# Patient Record
Sex: FEMALE | Race: BLACK OR AFRICAN AMERICAN | NOT HISPANIC OR LATINO | Employment: OTHER | URBAN - METROPOLITAN AREA
[De-identification: names, ages, dates, MRNs, and addresses within clinical notes are randomized per-mention and may not be internally consistent; named-entity substitution may affect disease eponyms.]

---

## 2019-10-24 ENCOUNTER — OFFICE VISIT (OUTPATIENT)
Dept: FAMILY MEDICINE | Facility: CLINIC | Age: 24
End: 2019-10-24
Payer: COMMERCIAL

## 2019-10-24 ENCOUNTER — LAB VISIT (OUTPATIENT)
Dept: LAB | Facility: HOSPITAL | Age: 24
End: 2019-10-24
Attending: FAMILY MEDICINE
Payer: COMMERCIAL

## 2019-10-24 VITALS
DIASTOLIC BLOOD PRESSURE: 79 MMHG | HEIGHT: 65 IN | SYSTOLIC BLOOD PRESSURE: 117 MMHG | WEIGHT: 159 LBS | OXYGEN SATURATION: 97 % | RESPIRATION RATE: 16 BRPM | BODY MASS INDEX: 26.49 KG/M2 | HEART RATE: 84 BPM

## 2019-10-24 DIAGNOSIS — M54.50 CHRONIC MIDLINE LOW BACK PAIN WITHOUT SCIATICA: ICD-10-CM

## 2019-10-24 DIAGNOSIS — Z76.89 ENCOUNTER TO ESTABLISH CARE WITH NEW DOCTOR: ICD-10-CM

## 2019-10-24 DIAGNOSIS — S41.001A OPEN WOUND OF RIGHT SHOULDER, INITIAL ENCOUNTER: ICD-10-CM

## 2019-10-24 DIAGNOSIS — G89.29 CHRONIC MIDLINE LOW BACK PAIN WITHOUT SCIATICA: ICD-10-CM

## 2019-10-24 DIAGNOSIS — G35 MULTIPLE SCLEROSIS: Primary | ICD-10-CM

## 2019-10-24 LAB
BASOPHILS # BLD AUTO: 0.09 K/UL (ref 0–0.2)
BASOPHILS NFR BLD: 1 % (ref 0–1.9)
DIFFERENTIAL METHOD: ABNORMAL
EOSINOPHIL # BLD AUTO: 0.2 K/UL (ref 0–0.5)
EOSINOPHIL NFR BLD: 2.1 % (ref 0–8)
ERYTHROCYTE [DISTWIDTH] IN BLOOD BY AUTOMATED COUNT: 15.4 % (ref 11.5–14.5)
HCT VFR BLD AUTO: 35.5 % (ref 37–48.5)
HGB BLD-MCNC: 11.2 G/DL (ref 12–16)
IMM GRANULOCYTES # BLD AUTO: 0.03 K/UL (ref 0–0.04)
IMM GRANULOCYTES NFR BLD AUTO: 0.3 % (ref 0–0.5)
LYMPHOCYTES # BLD AUTO: 1.6 K/UL (ref 1–4.8)
LYMPHOCYTES NFR BLD: 18.3 % (ref 18–48)
MCH RBC QN AUTO: 26.7 PG (ref 27–31)
MCHC RBC AUTO-ENTMCNC: 31.5 G/DL (ref 32–36)
MCV RBC AUTO: 85 FL (ref 82–98)
MONOCYTES # BLD AUTO: 0.9 K/UL (ref 0.3–1)
MONOCYTES NFR BLD: 10 % (ref 4–15)
NEUTROPHILS # BLD AUTO: 6.1 K/UL (ref 1.8–7.7)
NEUTROPHILS NFR BLD: 68.3 % (ref 38–73)
NRBC BLD-RTO: 0 /100 WBC
PLATELET # BLD AUTO: 398 K/UL (ref 150–350)
PMV BLD AUTO: 9.1 FL (ref 9.2–12.9)
RBC # BLD AUTO: 4.2 M/UL (ref 4–5.4)
WBC # BLD AUTO: 8.93 K/UL (ref 3.9–12.7)

## 2019-10-24 PROCEDURE — 99203 PR OFFICE/OUTPT VISIT, NEW, LEVL III, 30-44 MIN: ICD-10-PCS | Mod: S$GLB,,, | Performed by: FAMILY MEDICINE

## 2019-10-24 PROCEDURE — 99203 OFFICE O/P NEW LOW 30 MIN: CPT | Mod: S$GLB,,, | Performed by: FAMILY MEDICINE

## 2019-10-24 PROCEDURE — 3008F BODY MASS INDEX DOCD: CPT | Mod: S$GLB,,, | Performed by: FAMILY MEDICINE

## 2019-10-24 PROCEDURE — 87070 CULTURE OTHR SPECIMN AEROBIC: CPT

## 2019-10-24 PROCEDURE — 3008F PR BODY MASS INDEX (BMI) DOCUMENTED: ICD-10-PCS | Mod: S$GLB,,, | Performed by: FAMILY MEDICINE

## 2019-10-24 PROCEDURE — 87077 CULTURE AEROBIC IDENTIFY: CPT

## 2019-10-24 PROCEDURE — 85025 COMPLETE CBC W/AUTO DIFF WBC: CPT

## 2019-10-24 PROCEDURE — 87186 SC STD MICRODIL/AGAR DIL: CPT

## 2019-10-24 PROCEDURE — 36415 COLL VENOUS BLD VENIPUNCTURE: CPT

## 2019-10-24 RX ORDER — OXYCODONE AND ACETAMINOPHEN 10; 325 MG/1; MG/1
1 TABLET ORAL EVERY 4 HOURS PRN
COMMUNITY
End: 2019-10-24 | Stop reason: SDUPTHER

## 2019-10-24 RX ORDER — OXYCODONE AND ACETAMINOPHEN 10; 325 MG/1; MG/1
1 TABLET ORAL EVERY 12 HOURS PRN
Qty: 60 TABLET | Refills: 0 | Status: SHIPPED | OUTPATIENT
Start: 2019-10-24 | End: 2019-11-18 | Stop reason: SDUPTHER

## 2019-10-24 RX ORDER — AMITRIPTYLINE HYDROCHLORIDE 25 MG/1
25 TABLET, FILM COATED ORAL NIGHTLY PRN
COMMUNITY
End: 2019-11-15 | Stop reason: ALTCHOICE
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
## 2019-10-28 LAB — BACTERIA SPEC AEROBE CULT: ABNORMAL

## 2019-10-30 RX ORDER — CIPROFLOXACIN 750 MG/1
750 TABLET, FILM COATED ORAL EVERY 12 HOURS
Qty: 28 TABLET | Refills: 0 | Status: SHIPPED | OUTPATIENT
Start: 2019-10-30 | End: 2019-11-13

## 2019-10-31 ENCOUNTER — TELEPHONE (OUTPATIENT)
Dept: FAMILY MEDICINE | Facility: CLINIC | Age: 24
End: 2019-10-31

## 2019-10-31 NOTE — TELEPHONE ENCOUNTER
----- Message from Mireya Vieira sent at 10/31/2019 11:59 AM CDT -----  Contact: Celia at [facility]  Type: Needs Medical Advice    Who Called:  Celia  Best Call Back Number: [phone]  Additional Information: Celia adv'd they received orders/referral for pt but have been unsuccessful in making any contact w/ pt. They have even gone to the pt's house. Pls call regarding.

## 2019-10-31 NOTE — TELEPHONE ENCOUNTER
Spoke with patient, currently admitted to [facility] at this time for her MS.     + Spoke with April at [facility] and advised she is currently inpatient at [facility] and that once DC'd from [facility] to contact patient. April added her to her list to call and check on daily, as well as will be notified of DC and will continue care at home. All parties verbalized understanding with no further questions at this time.

## 2019-11-04 ENCOUNTER — PATIENT OUTREACH (OUTPATIENT)
Dept: ADMINISTRATIVE | Facility: HOSPITAL | Age: 24
End: 2019-11-04

## 2019-11-04 NOTE — LETTER
November 4, 2019    Jonel Del Angel  [address] [place] [address]             [facility]  [address]  [place] [address]  Phone: [phone] Dear Kevenjosé    [facility] to your overall health and would like to ensure that you are up to date on your recommended test and/or procedures.   Caridad Elena MD  has found that your chart shows you may be due for the following:    Health Maintenance Due   Topic Date Due    Lipid Panel  1994    HPV Vaccines (1 - Female 3-dose series) 04/08/2009    TETANUS VACCINE  04/08/2012    Influenza Vaccine (1) 09/01/2019     If you have had any of the above done at another facility, please let us know so that we may obtain copies from that facility.  If you have a copy of these records, please provide a copy for us to scan into your chart.  You are welcome to request that the report be faxed to us at  ([phone]).     Otherwise, please schedule these appointments at your earliest convenience by calling [phone] or going to [email or web address].    If you have an upcoming scheduled appointment for the item above, please disregard this letter.    Sincerely,  Your [facility]  MD Kamilah Simon L.P.N. Clinical Care Coordinator  [address] [place] [address], [place] [address] [phone]

## 2019-11-07 ENCOUNTER — PATIENT OUTREACH (OUTPATIENT)
Dept: ADMINISTRATIVE | Facility: HOSPITAL | Age: 24
End: 2019-11-07

## 2019-11-07 NOTE — LETTER
November 7, 2019    Jonel Del Angel  [address] [place] [address]             [facility]  [address]  [place] [address]  Phone: [phone] Dear Kevenjosé    [facility] to your overall health and would like to ensure that you are up to date on your recommended test and/or procedures.   Caridad Elena MD  has found that your chart shows you may be due for the following:    Health Maintenance Due   Topic Date Due    Lipid Panel  1994    HPV Vaccines (1 - Female 3-dose series) 04/08/2009    TETANUS VACCINE  04/08/2012    Influenza Vaccine (1) 09/01/2019     If you have had any of the above done at another facility, please let us know so that we may obtain copies from that facility.  If you have a copy of these records, please provide a copy for us to scan into your chart.  You are welcome to request that the report be faxed to us at  ([phone]).     Otherwise, please schedule these appointments at your earliest convenience by calling [phone] or going to [email or web address].    If you have an upcoming scheduled appointment for the item above, please disregard this letter.    Sincerely,  [facility]  MD Kamilah Simon L.P.N. Clinical Care Coordinator  [address] [place] [address], [place] [address] [phone]

## 2019-11-08 ENCOUNTER — TELEPHONE (OUTPATIENT)
Dept: FAMILY MEDICINE | Facility: CLINIC | Age: 24
End: 2019-11-08

## 2019-11-08 DIAGNOSIS — R33.9 URINARY RETENTION: Primary | ICD-10-CM

## 2019-11-08 NOTE — TELEPHONE ENCOUNTER
Contacted Yina with [facility] and she states that pt discharged from hospital with indwelling foster catheter due to urinary retention after voiding trial prior to discharge.  Asking for orders to change foster every month and to place foster PRN for dislodgement. Current foster placed 11/4/19.  Please advise.  States she can take a verbal over the phone.  Also requesting a urology consult.

## 2019-11-08 NOTE — TELEPHONE ENCOUNTER
----- Message from Vinita Weaver sent at 11/8/2019  2:13 PM CST -----  Contact: Yina russo/[facility] [phone]  She is requesting catheter orders for this patient. Patient also needs a urology referral. Please call her. Thank you!

## 2019-11-15 ENCOUNTER — TELEPHONE (OUTPATIENT)
Dept: UROLOGY | Facility: CLINIC | Age: 24
End: 2019-11-15

## 2019-11-15 ENCOUNTER — OFFICE VISIT (OUTPATIENT)
Dept: UROLOGY | Facility: CLINIC | Age: 24
End: 2019-11-15
Payer: COMMERCIAL

## 2019-11-15 VITALS
BODY MASS INDEX: 26.66 KG/M2 | DIASTOLIC BLOOD PRESSURE: 82 MMHG | RESPIRATION RATE: 16 BRPM | WEIGHT: 160 LBS | TEMPERATURE: 98 F | HEART RATE: 117 BPM | HEIGHT: 65 IN | SYSTOLIC BLOOD PRESSURE: 125 MMHG

## 2019-11-15 DIAGNOSIS — N31.9 NEUROGENIC BLADDER: ICD-10-CM

## 2019-11-15 PROCEDURE — 3008F BODY MASS INDEX DOCD: CPT | Mod: S$GLB,,, | Performed by: UROLOGY

## 2019-11-15 PROCEDURE — 99999 PR PBB SHADOW E&M-EST. PATIENT-LVL III: ICD-10-PCS | Mod: PBBFAC,,, | Performed by: UROLOGY

## 2019-11-15 PROCEDURE — 99203 PR OFFICE/OUTPT VISIT, NEW, LEVL III, 30-44 MIN: ICD-10-PCS | Mod: S$GLB,,, | Performed by: UROLOGY

## 2019-11-15 PROCEDURE — 99999 PR PBB SHADOW E&M-EST. PATIENT-LVL III: CPT | Mod: PBBFAC,,, | Performed by: UROLOGY

## 2019-11-15 PROCEDURE — 99203 OFFICE O/P NEW LOW 30 MIN: CPT | Mod: S$GLB,,, | Performed by: UROLOGY

## 2019-11-15 PROCEDURE — 3008F PR BODY MASS INDEX (BMI) DOCUMENTED: ICD-10-PCS | Mod: S$GLB,,, | Performed by: UROLOGY

## 2019-11-15 RX ORDER — CLONAZEPAM 0.5 MG/1
TABLET ORAL
Refills: 2 | COMMUNITY
Start: 2019-11-08

## 2019-11-15 RX ORDER — CARBAMAZEPINE 100 MG/1
CAPSULE, EXTENDED RELEASE ORAL
Refills: 2 | COMMUNITY
Start: 2019-11-02

## 2019-11-15 NOTE — PROGRESS NOTES
Mrs. Del Angel is an unfortunate 24 year old female who has developed AMS.  On 10/24/2019 the patient was found to be in urinary retention.  Also have a urinary tract infection she was treated for the infection and the catheter has been placed for temporary drainage.    The patient is here for management of her neurogenic bladder dysfunction secondary to MS.  She was present with her mother and I had a lengthy discussion with both of them regarding her condition.  The patient at the present time is wheelchair-bound under further have lost their sensation in the lower part of the abdomen and lower extremities.  She was concerned if we can remove the Serrato catheter.  I explained to her that this same problem that is happening to her lower part of the body and lower extremities is happening to her bladder and will not be in the best interest of her to have the catheter removed at this time.  She is undergoing a physical rehabilitation and I explained to her that good sign that we can probably remove the catheter for a voiding trial will be when she is able to walk a again.  In the meantime I suggested we keep the Serrato catheter unchanged every 4 weeks by home health.  We are not going to treat any infections unless the patient becomes febrile with gross hematuria and pain.  I suggested she should drink a large amount of fluids to have a good urine output.  I also suggest for her to take vitamin-C 500 mg twice a day to acidify the urine on avoid a significant UTIs.    We are contacting home health with instructions on a.m. she is going to return to the clinic in as-needed basis.  When she has started walking again we may consider a short course of antibiotics p.o. on a voiding trial.  All the questions were answered to her satisfaction.  She left the office in satisfactory condition.  I spent approximately 25 min with Ms. Del Angel of the time was spent in counseling    Adriano Santos

## 2019-11-15 NOTE — TELEPHONE ENCOUNTER
Gave verbal orders to Yina for cath change every 4 weeks and Vit C 500U twice daily. Also provided her with my contact info for any future problems with pt and if pt would become walking again. She verbalized understanding.

## 2019-11-15 NOTE — LETTER
November 15, 2019      Max Bermudez MD  [address] [place] [address]           [facility]  [address] [place] [address]  Phone: [phone]  Fax: [phone]          Patient: Jonel Del Angel   MR Number: [id number]   YOB: 1995   Date of Visit: 11/15/2019       Dear Dr. Max Bermudez:    Thank you for referring Jonel Del Angel to me for evaluation. Attached you will find relevant portions of my assessment and plan of care.    If you have questions, please do not hesitate to call me. I look forward to following Jonel Del Angel along with you.    Sincerely,    Adriano Santos MD    Enclosure  CC:  No Recipients    If you would like to receive this communication electronically, please contact [email or web address] or [phone] to request more information on [facility] access.    For providers and/or their staff who would like to refer a patient to [facility], please contact us through our one-stop-shop provider referral line, [facility] [place], at [phone].    If you feel you have received this communication in error or would no longer like to receive these types of communications, please e-mail [email or web address]

## 2019-11-18 ENCOUNTER — OFFICE VISIT (OUTPATIENT)
Dept: FAMILY MEDICINE | Facility: CLINIC | Age: 24
End: 2019-11-18
Payer: COMMERCIAL

## 2019-11-18 VITALS
SYSTOLIC BLOOD PRESSURE: 112 MMHG | OXYGEN SATURATION: 97 % | HEART RATE: 129 BPM | RESPIRATION RATE: 17 BRPM | HEIGHT: 65 IN | DIASTOLIC BLOOD PRESSURE: 75 MMHG | WEIGHT: 160 LBS | BODY MASS INDEX: 26.66 KG/M2

## 2019-11-18 DIAGNOSIS — S41.001D OPEN WOUND OF RIGHT SHOULDER, SUBSEQUENT ENCOUNTER: ICD-10-CM

## 2019-11-18 DIAGNOSIS — Z02.89 PAIN MANAGEMENT CONTRACT AGREEMENT: ICD-10-CM

## 2019-11-18 DIAGNOSIS — G89.29 CHRONIC BILATERAL LOW BACK PAIN WITHOUT SCIATICA: ICD-10-CM

## 2019-11-18 DIAGNOSIS — G35 MULTIPLE SCLEROSIS: ICD-10-CM

## 2019-11-18 DIAGNOSIS — M54.50 CHRONIC BILATERAL LOW BACK PAIN WITHOUT SCIATICA: ICD-10-CM

## 2019-11-18 DIAGNOSIS — Z09 HOSPITAL DISCHARGE FOLLOW-UP: Primary | ICD-10-CM

## 2019-11-18 PROCEDURE — 99214 OFFICE O/P EST MOD 30 MIN: CPT | Mod: S$GLB,,, | Performed by: FAMILY MEDICINE

## 2019-11-18 PROCEDURE — 3008F BODY MASS INDEX DOCD: CPT | Mod: S$GLB,,, | Performed by: FAMILY MEDICINE

## 2019-11-18 PROCEDURE — 3008F PR BODY MASS INDEX (BMI) DOCUMENTED: ICD-10-PCS | Mod: S$GLB,,, | Performed by: FAMILY MEDICINE

## 2019-11-18 PROCEDURE — 99214 PR OFFICE/OUTPT VISIT, EST, LEVL IV, 30-39 MIN: ICD-10-PCS | Mod: S$GLB,,, | Performed by: FAMILY MEDICINE

## 2019-11-18 RX ORDER — OXYCODONE AND ACETAMINOPHEN 10; 325 MG/1; MG/1
1 TABLET ORAL EVERY 12 HOURS PRN
Qty: 60 TABLET | Refills: 0 | Status: SHIPPED | OUTPATIENT
Start: 2019-11-18 | End: 2019-12-09 | Stop reason: SDUPTHER

## 2019-12-05 ENCOUNTER — TELEPHONE (OUTPATIENT)
Dept: UROLOGY | Facility: CLINIC | Age: 24
End: 2019-12-05

## 2019-12-05 DIAGNOSIS — N39.0 URINARY TRACT INFECTION ASSOCIATED WITH INDWELLING URETHRAL CATHETER, INITIAL ENCOUNTER: ICD-10-CM

## 2019-12-05 DIAGNOSIS — N31.9 NEUROGENIC BLADDER: Primary | ICD-10-CM

## 2019-12-05 DIAGNOSIS — N32.89 BLADDER SPASMS: ICD-10-CM

## 2019-12-05 DIAGNOSIS — T83.511A URINARY TRACT INFECTION ASSOCIATED WITH INDWELLING URETHRAL CATHETER, INITIAL ENCOUNTER: ICD-10-CM

## 2019-12-05 RX ORDER — OXYBUTYNIN CHLORIDE 10 MG/1
10 TABLET, EXTENDED RELEASE ORAL DAILY
Qty: 30 TABLET | Refills: 11 | Status: SHIPPED | OUTPATIENT
Start: 2019-12-05 | End: 2020-09-01

## 2019-12-05 RX ORDER — CIPROFLOXACIN 500 MG/1
500 TABLET ORAL EVERY 12 HOURS
Qty: 40 TABLET | Refills: 0 | Status: SHIPPED | OUTPATIENT
Start: 2019-12-05 | End: 2019-12-25

## 2019-12-05 NOTE — TELEPHONE ENCOUNTER
----- Message from Jessica Innerkirstie sent at 12/5/2019 12:42 PM CST -----  Contact: Yina w/ [facility]  Yina changed her foster catheter last Tuesday and now she is having a lot of urine leakage around the catheter.   Patients urine is milky and has a lot debris.  Yina is going out there this afternoon to check on her. Call back to advise at [phone].   Thanks

## 2019-12-05 NOTE — TELEPHONE ENCOUNTER
Spoke with home health nurse Yina and she states there is urine leakage around the pts catheter and its milky with sediment. Gave verbal order for urinalysis and culture. Also informed her I would send message to provider for some medication for UTI and bladder spasms. Yina verbalized understanding.

## 2019-12-05 NOTE — TELEPHONE ENCOUNTER
Informed pt that antibiotic and anti bladder spasm medication has been sent to [facility]. Explained medication directions. Pt verbalized understanding.

## 2019-12-09 ENCOUNTER — TELEPHONE (OUTPATIENT)
Dept: HOME HEALTH SERVICES | Facility: HOSPITAL | Age: 24
End: 2019-12-09

## 2019-12-09 DIAGNOSIS — G89.29 CHRONIC MIDLINE LOW BACK PAIN WITHOUT SCIATICA: ICD-10-CM

## 2019-12-09 DIAGNOSIS — G35 MULTIPLE SCLEROSIS: ICD-10-CM

## 2019-12-09 DIAGNOSIS — M54.50 CHRONIC MIDLINE LOW BACK PAIN WITHOUT SCIATICA: ICD-10-CM

## 2019-12-09 NOTE — TELEPHONE ENCOUNTER
+Refill request(s). Please advise. Thank you.       ----- Message from Toya Coker sent at 12/9/2019  2:22 PM CST -----  Contact: Pt  Type:  RX Refill Request    Who Called:  PT  Refill or New Rx:  Refill  RX Name and Strength:  oxyCODONE-acetaminophen (PERCOCET)  mg per tablet  How is the patient currently taking it? (ex. 1XDay):  As directed  Is this a 30 day or 90 day RX:  30  Preferred Pharmacy with phone number:    [facility] [place] - [address]  [address]  [facility] [address]  [place] [address]  Phone: [phone] Fax: [phone]  Local or Mail Order:  local  Ordering Provider:  Ulices Maya Call Back Number:  [phone]  Additional Information:  Please Advise ---Thank you

## 2019-12-10 RX ORDER — OXYCODONE AND ACETAMINOPHEN 10; 325 MG/1; MG/1
1 TABLET ORAL EVERY 12 HOURS PRN
Qty: 60 TABLET | Refills: 0 | Status: SHIPPED | OUTPATIENT
Start: 2019-12-10 | End: 2020-01-16 | Stop reason: SDUPTHER

## 2019-12-15 PROBLEM — G35 MULTIPLE SCLEROSIS: Status: ACTIVE | Noted: 2019-12-15

## 2019-12-15 PROBLEM — Z02.89 PAIN MANAGEMENT CONTRACT AGREEMENT: Status: ACTIVE | Noted: 2019-12-15

## 2019-12-16 NOTE — PROGRESS NOTES
[facility] Note    Subjective      Ms. Del Angel is a 24 y.o. female who presents to clinic for a hospital follow up.    Patient was seen 1 month ago to establish care.   Patient has a history of MS and couple days after her initial visit she was admitted to [facility] for a MS flare.  Her hospital stay was about 3 weeks long.   She is no wheel-chair bound. Has a urinary cath in place.     At her initial visit with me, she was found to have an open wound on her right shoulder that was infected. She states that while inpatient, she was being treated for it. States that she called  when she was discharged from the hospital but no one came by. Reports that the wound has improved.     She is currently on percocet prn pain. Needs to sign pain contract.    PMH Jonel has a past medical history of MS (multiple sclerosis) (06/2018).   PSXH Jonel has a past surgical history that includes [place] tooth extraction (2015).   CECILY Remy's family history includes No Known Problems in her father and mother.   MITCH Remy reports that she has never smoked. She has never used smokeless tobacco. She reports that she drank alcohol. She reports that she has current or past drug history.   EMILIANO Remy is allergic to penicillins; trazodone; and ibuprofen.   MED Jonel has a current medication list which includes the following prescription(s): carbamazepine, clonazepam, ciprofloxacin hcl, oxybutynin, and oxycodone-acetaminophen.     Review of Systems   Constitutional: Negative for appetite change, chills and fever.   HENT: Negative for congestion.    Eyes: Negative for visual disturbance.   Respiratory: Negative for cough and shortness of breath.    Cardiovascular: Negative for chest pain.   Gastrointestinal: Negative for abdominal pain.   Musculoskeletal: Positive for back pain.   Skin: Positive for wound. Negative for color change.   Neurological: Negative for dizziness, syncope and headaches.     Objective     /75 (BP Location:  "Left arm, Patient Position: Sitting, BP Method: Medium (Automatic))   Pulse (!) 129   Resp 17   Ht 5' 5" (1.651 m)   Wt 72.6 kg (160 lb)   SpO2 97%   BMI 26.63 kg/m²     Physical Exam   Constitutional: She appears well-developed and well-nourished. No distress.   In wheelchair   HENT:   Head: Normocephalic and atraumatic.   Eyes: Conjunctivae are normal. Right eye exhibits no discharge. Left eye exhibits no discharge.   Cardiovascular: Normal rate, regular rhythm, normal heart sounds and intact distal pulses. Exam reveals no gallop and no friction rub.   No murmur heard.  No pitting edema in the lower extremities bilaterally   Pulmonary/Chest: Effort normal and breath sounds normal. No stridor. No respiratory distress. She has no wheezes. She has no rales.   Neurological: She is alert.   Skin: Skin is warm and dry. Capillary refill takes less than 2 seconds. She is not diaphoretic.   Wound on the right shoulder has much improved from previous visit. No drainage or warmth noted. +epithialization and granulation noted.    Psychiatric: She has a normal mood and affect. Her behavior is normal.   Vitals reviewed.           Assessment/Plan     Jonel was seen today for hospital discharge follow-up.    Diagnoses and all orders for this visit:    Hospital discharge follow-up       - Will obtain records from [facility] and review    Open wound of right shoulder, subsequent encounter       - Healing well. Will contact [facility] for wound care at home.     Multiple sclerosis  -   oxyCODONE-acetaminophen (PERCOCET)  mg per tablet; Take 1 tablet by mouth every 12 (twelve) hours as needed for Pain.    Chronic midline low back pain without sciatica  -    oxyCODONE-acetaminophen (PERCOCET)  mg per tablet; Take 1 tablet by mouth every 12 (twelve) hours as needed for Pain.    Pain management contract agreement         - Discussed the rules of signing pain contract with me. Patient agreed and signed.          - [facility] reviewed " by me and no suspicious activity was noted.    Follow up in about 2 months (around 1/18/2020).    Future Appointments   Date Time Provider Department Center   1/23/2020 10:40 AM Caridad Elena MD [facility]       Caridad Elena MD  Family Medicine  [facility]

## 2019-12-17 ENCOUNTER — TELEPHONE (OUTPATIENT)
Dept: UROLOGY | Facility: CLINIC | Age: 24
End: 2019-12-17

## 2019-12-17 NOTE — TELEPHONE ENCOUNTER
----- Message from Toya Coker sent at 12/17/2019  1:20 PM CST -----  Contact: [facility]   Type: Needs Medical Advice    Who Called:  Yina  Nurse  Best Call Back Number:  [phone]  Additional Information: Requesting a call back regarding pt wanting her cath completely removed  . Pt is very addiment and pt told nurse  Because its due to be changed but if the nurses changes it the pt will not let her but it back in   Please Advise ---Thank you

## 2019-12-18 ENCOUNTER — TELEPHONE (OUTPATIENT)
Dept: UROLOGY | Facility: CLINIC | Age: 24
End: 2019-12-18

## 2019-12-18 ENCOUNTER — TELEPHONE (OUTPATIENT)
Dept: FAMILY MEDICINE | Facility: CLINIC | Age: 24
End: 2019-12-18

## 2019-12-18 ENCOUNTER — TELEPHONE (OUTPATIENT)
Dept: HOME HEALTH SERVICES | Facility: HOSPITAL | Age: 24
End: 2019-12-18

## 2019-12-18 NOTE — TELEPHONE ENCOUNTER
----- Message from Vinita Weaver sent at 12/18/2019  9:22 AM CST -----  Contact: Yina russo/[facility] [phone]  The foster catheter came out, patient refused to let her put it back in.  She did inform the patient that is she does not urinate in 6-8 hours, to contact health.  Please call Yina regarding this issue.  Thank you!

## 2019-12-18 NOTE — TELEPHONE ENCOUNTER
Informed [facility] nurse Yina to leave out the pts catheter per pts request. Start I&Ox24 hrs and report to ER if she requests to resume cath.   Voiced understanding, states she will report I&O details tomorrow afternoon.           ----- Message from Austen Thornton sent at 12/18/2019 11:31 AM CST -----  Contact: Yina with [facility]   [phone]  Type: Needs Medical Advice    Who Called:  Yina with [facility]   [phone]    Additional Information:  Advised returning a call to [place] regarding the ptnt's catheter. Please call.

## 2019-12-18 NOTE — TELEPHONE ENCOUNTER
"Spoke with Yina with [facility] and she wanted to get VO's from the physician to do heel dressing changes as patient has been bed bound and chair bound only for approx 1 mo now. Yina states they have been rotating her, and transfering from bed to chair, but her heels are getting boggy, due to family not elevating with soft pillows. States no skin breakdown at this time, but the skin is "softer" and would like to do heel dressing and changes. Yina also states that she went to change patient catheter and balloon was completely deflated, and cath was out, just in between her thighs, patient did not want cath replaced. Yina states that patient did inform her that she was able to urinate today, and was advised by  that if over the weekend she is unable to contact HH and they will replace the cath. Advised Yina I would forward message to MD for OK to heel dressing change, and once received response office would contact her. Yina verbalized understanding with no further concerns or questions at this time.   "

## 2019-12-18 NOTE — TELEPHONE ENCOUNTER
[facility] Int Order # [id number] with [facility] - Dr. Adriano Santos

## 2019-12-18 NOTE — TELEPHONE ENCOUNTER
----- Message from Austen Thornton sent at 12/18/2019 11:42 AM CST -----  Contact: Yina with [facility]   [phone]  Type: Needs Medical Advice    Who Called: Yina with [facility]   [phone]    Symptoms (please be specific):  Heels are boggy will need padded dressings.    How long has patient had these symptoms:  For quite a while past two weeks.    Best Call Back Number: Yina with [facility]   [phone]    Additional Information: Advised needs an authorization to order additional padded dressings for the ptnt's heels. Please call to confirm.

## 2020-01-04 PROCEDURE — G0179 MD RECERTIFICATION HHA PT: HCPCS | Mod: ,,, | Performed by: FAMILY MEDICINE

## 2020-01-04 PROCEDURE — G0179 PR HOME HEALTH MD RECERTIFICATION: ICD-10-PCS | Mod: ,,, | Performed by: FAMILY MEDICINE

## 2020-01-08 ENCOUNTER — TELEPHONE (OUTPATIENT)
Dept: HOME HEALTH SERVICES | Facility: HOSPITAL | Age: 25
End: 2020-01-08

## 2020-01-08 NOTE — TELEPHONE ENCOUNTER
[facility] Int Order # [id number] with [facility] - Dr. Caridad Elena

## 2020-01-09 ENCOUNTER — PATIENT OUTREACH (OUTPATIENT)
Dept: ADMINISTRATIVE | Facility: HOSPITAL | Age: 25
End: 2020-01-09

## 2020-01-09 NOTE — LETTER
January 9, 2020    Jonel Bean  [address] [place] [address]             [facility]  [address]  [place] [address]  Phone: [phone] Dear Kevenjosé    [facility] is committed to your overall health and would like to ensure that you are up to date on your recommended test and/or procedures.   Caridad Elena MD  has found that your chart shows you may be due for the following:    Health Maintenance Due   Topic Date Due    Lipid Panel  1994    HPV Vaccines (1 - Female 3-dose series) 04/08/2009    TETANUS VACCINE  04/08/2012    Complete Opioid Risk Tool  04/08/2012    Urine Drug Screen  04/08/2012    Naloxone Prescription  04/08/2012    Influenza Vaccine (1) 09/01/2019     If you have had any of the above done at another facility, please let us know so that we may obtain copies from that facility.  If you have a copy of these records, please provide a copy for us to scan into your chart.  You are welcome to request that the report be faxed to us at  ([phone]).     Otherwise, please schedule these appointments at your earliest convenience by calling [phone] or going to [email or web address].    If you have an upcoming scheduled appointment for the item above, please disregard this letter.    Sincerely,  Your [facility]  MD Kamilah Simon L.P.N. Clinical Care Coordinator  [address] [place] [address], [place] [address] [phone]

## 2020-01-16 ENCOUNTER — OFFICE VISIT (OUTPATIENT)
Dept: FAMILY MEDICINE | Facility: CLINIC | Age: 25
End: 2020-01-16
Payer: COMMERCIAL

## 2020-01-16 VITALS
HEART RATE: 122 BPM | DIASTOLIC BLOOD PRESSURE: 71 MMHG | BODY MASS INDEX: 26.67 KG/M2 | WEIGHT: 160.06 LBS | RESPIRATION RATE: 17 BRPM | OXYGEN SATURATION: 97 % | SYSTOLIC BLOOD PRESSURE: 104 MMHG | HEIGHT: 65 IN

## 2020-01-16 DIAGNOSIS — S41.001D OPEN WOUND OF RIGHT SHOULDER, SUBSEQUENT ENCOUNTER: ICD-10-CM

## 2020-01-16 DIAGNOSIS — G89.29 CHRONIC MIDLINE LOW BACK PAIN WITHOUT SCIATICA: ICD-10-CM

## 2020-01-16 DIAGNOSIS — G35 MULTIPLE SCLEROSIS: Primary | ICD-10-CM

## 2020-01-16 DIAGNOSIS — M54.50 CHRONIC MIDLINE LOW BACK PAIN WITHOUT SCIATICA: ICD-10-CM

## 2020-01-16 PROCEDURE — 3008F BODY MASS INDEX DOCD: CPT | Mod: S$GLB,,, | Performed by: FAMILY MEDICINE

## 2020-01-16 PROCEDURE — 99214 OFFICE O/P EST MOD 30 MIN: CPT | Mod: S$GLB,,, | Performed by: FAMILY MEDICINE

## 2020-01-16 PROCEDURE — 3008F PR BODY MASS INDEX (BMI) DOCUMENTED: ICD-10-PCS | Mod: S$GLB,,, | Performed by: FAMILY MEDICINE

## 2020-01-16 PROCEDURE — 99214 PR OFFICE/OUTPT VISIT, EST, LEVL IV, 30-39 MIN: ICD-10-PCS | Mod: S$GLB,,, | Performed by: FAMILY MEDICINE

## 2020-01-16 RX ORDER — OXYCODONE AND ACETAMINOPHEN 10; 325 MG/1; MG/1
1 TABLET ORAL EVERY 12 HOURS PRN
Qty: 60 TABLET | Refills: 0 | Status: SHIPPED | OUTPATIENT
Start: 2020-01-16 | End: 2020-02-10 | Stop reason: SDUPTHER

## 2020-01-16 NOTE — TELEPHONE ENCOUNTER
----- Message from Cesar Camp sent at 1/16/2020  4:47 PM CST -----  Type:  Patient Returning Call    Who Called:  Kristy/[facility]    Who Left Message for Patient:  Orquidea  Does the patient know what this is regarding?:  Petroleum gauze dressing  Best Call Back Number:  [phone]  Additional Information:

## 2020-01-20 ENCOUNTER — TELEPHONE (OUTPATIENT)
Dept: FAMILY MEDICINE | Facility: CLINIC | Age: 25
End: 2020-01-20

## 2020-01-20 NOTE — TELEPHONE ENCOUNTER
Notified Kristy with [facility] about Dr Bishop's orders. Nurse voiced understanding.    LVM for Kristy with [facility] to return call for orders.X2      ----- Message from Caridad Elena MD sent at 1/19/2020 11:09 AM CST -----  Please apply petroleum guaze to right shoulder daily     ----- Message -----  From: Tabatha Freeman LPN  Sent: 1/17/2020   5:49 PM CST  To: Caridad Elena MD        ----- Message -----  From: Mira Cerda  Sent: 1/17/2020   1:33 PM CST  To: Ulices ARZATE Staff     Kristy  With  [facility]   Calling about a  Wound  Care order // please call  For details [phone]   Fax# [phone]

## 2020-01-27 NOTE — PROGRESS NOTES
"[facility] - Clinic Note    Subjective      Ms. Del Angel is a 24 y.o. female who presents to clinic for a medication refill.       Patient has a history of MS.  No recent hospitalizations.   No recent flare ups.   Doing well.   Continues to be followed by a specialist in [place]. Next infusion is in a couple of months.  Going through PT. Getting stronger.   Currently on PM contract. Receives percocet for chronic back pain secondary to MS.    Still has wound on right shoulder. Reports getting better. Nurse comes to change dressing.    PMH Jonel has a past medical history of MS (multiple sclerosis) (06/2018).   PSXH Jonel has a past surgical history that includes [place] tooth extraction (2015).    Jonel's family history includes No Known Problems in her father and mother.   MITCH Remy reports that she has never smoked. She has never used smokeless tobacco. She reports that she drank alcohol. She reports that she has current or past drug history.   ALG Jonel is allergic to penicillins; trazodone; and ibuprofen.   MED Jonel has a current medication list which includes the following prescription(s): carbamazepine, clonazepam, oxybutynin, oxycodone-acetaminophen, and bismuth tribrom-petrolatum,wh.     Review of Systems   Constitutional: Negative for chills and fever.   Eyes: Negative for visual disturbance.   Respiratory: Negative for cough and shortness of breath.    Cardiovascular: Negative for chest pain.   Gastrointestinal: Negative for abdominal pain.   Genitourinary: Negative for difficulty urinating.   Musculoskeletal: Positive for back pain and gait problem.   Neurological: Positive for weakness. Negative for dizziness and headaches.   Psychiatric/Behavioral: Negative for confusion.         Objective         /71 (BP Location: Left arm, Patient Position: Sitting, BP Method: Medium (Automatic))   Pulse (!) 122   Resp 17   Ht 5' 5" (1.651 m)   Wt 72.6 kg (160 lb 0.9 oz)   SpO2 97%   BMI 26.63 kg/m² "     Physical Exam   Constitutional: She appears well-developed and well-nourished. No distress.   AAF in wheelchair   HENT:   Head: Atraumatic.   Eyes: Right eye exhibits no discharge. Left eye exhibits no discharge.   Neck: Neck supple. No thyromegaly present.   Cardiovascular: Normal rate, regular rhythm, normal heart sounds and intact distal pulses. Exam reveals no gallop and no friction rub.   No murmur heard.  Pulmonary/Chest: Effort normal and breath sounds normal. No respiratory distress. She has no wheezes. She has no rales.   Lymphadenopathy:     She has no cervical adenopathy.   Neurological: She is alert.   Skin: Skin is warm and dry. Capillary refill takes less than 2 seconds. She is not diaphoretic.   Psychiatric: She has a normal mood and affect. Her behavior is normal.   Vitals reviewed.     Assessment/Plan     Jonel was seen today for follow-up of MS.    Diagnoses and all orders for this visit:    Multiple sclerosis  -     oxyCODONE-acetaminophen (PERCOCET)  mg per tablet; Take 1 tablet by mouth every 12 (twelve) hours as needed for Pain.    Chronic midline low back pain without sciatica  -     oxyCODONE-acetaminophen (PERCOCET)  mg per tablet; Take 1 tablet by mouth every 12 (twelve) hours as needed for Pain.    Open wound of right shoulder, subsequent encounter  -will continue  wound care nurse for further instructions on care    Follow up in about 2 months (around 3/16/2020).    Future Appointments   Date Time Provider Department Center   3/17/2020 10:40 AM Caridad Elena MD [facility]       Caridad Elena MD  Family Medicine  [facility]

## 2020-01-28 ENCOUNTER — TELEPHONE (OUTPATIENT)
Dept: FAMILY MEDICINE | Facility: CLINIC | Age: 25
End: 2020-01-28

## 2020-01-28 NOTE — TELEPHONE ENCOUNTER
"Spoke with Kristy. States "Her HR high and BP low. It staying like that. She isn't saying there is an odor or fever, I just wasn't sure if you wanted to get labs or a urine just incase. She did have a little constipation and I told her to take OTC stool softener".     ----- Message from Patience Redmond sent at 1/28/2020  1:55 PM CST -----  Type: Needs Medical Advice    Who Called:  [facility]/Kristy  Best Call Back Number: [phone]  Additional Information: Patient has couple of symptoms; low blood pressure. They are asking if office can order a UA. Also, if office wants them to draw any labs. Please call because she can take a verbal order. She will be seeing her on 1/30/20.    "

## 2020-01-31 ENCOUNTER — TELEPHONE (OUTPATIENT)
Dept: FAMILY MEDICINE | Facility: CLINIC | Age: 25
End: 2020-01-31

## 2020-01-31 NOTE — TELEPHONE ENCOUNTER
01/31/2020 4:15PM-- Spoke with Shanita, she saw patient yesterday and stated patient had a large bowel movement and is feeling much better.         ----- Message from Mary George sent at 1/31/2020  3:48 PM CST -----  Contact: Kristy from [facility] [place] [phone]  Call placed to pod . Kristy is calling back to speak with Mary call back [phone]

## 2020-02-10 DIAGNOSIS — G89.29 CHRONIC MIDLINE LOW BACK PAIN WITHOUT SCIATICA: ICD-10-CM

## 2020-02-10 DIAGNOSIS — G35 MULTIPLE SCLEROSIS: ICD-10-CM

## 2020-02-10 DIAGNOSIS — M54.50 CHRONIC MIDLINE LOW BACK PAIN WITHOUT SCIATICA: ICD-10-CM

## 2020-02-10 RX ORDER — OXYCODONE AND ACETAMINOPHEN 10; 325 MG/1; MG/1
1 TABLET ORAL EVERY 12 HOURS PRN
Qty: 60 TABLET | Refills: 0 | Status: SHIPPED | OUTPATIENT
Start: 2020-02-10 | End: 2020-03-16 | Stop reason: SDUPTHER

## 2020-02-10 NOTE — TELEPHONE ENCOUNTER
----- Message from Toya Coker sent at 2/10/2020  2:48 PM CST -----  Contact: PT  Type:  RX Refill Request    Who Called:  PT    Refill or New Rx:  refill  RX Name and Strength:  oxyCODONE-acetaminophen (PERCOCET)  mg per tablet  How is the patient currently taking it? (ex. 1XDay):  As Directed  Is this a 30 day or 90 day RX:  as Directed  Preferred Pharmacy with phone number:    [facility], [place] - [address]  [address]  [address] [address]  Phone: [phone] Fax: [phone]  Local or Mail Order:  Ana;  Ordering Provider:  merritt Maya Call Back Number:  [phone]  Additional Information:  Please Advise ---Thank you

## 2020-03-04 PROCEDURE — G0179 PR HOME HEALTH MD RECERTIFICATION: ICD-10-PCS | Mod: ,,, | Performed by: FAMILY MEDICINE

## 2020-03-04 PROCEDURE — G0179 MD RECERTIFICATION HHA PT: HCPCS | Mod: ,,, | Performed by: FAMILY MEDICINE

## 2020-03-16 DIAGNOSIS — M54.50 CHRONIC MIDLINE LOW BACK PAIN WITHOUT SCIATICA: ICD-10-CM

## 2020-03-16 DIAGNOSIS — G35 MULTIPLE SCLEROSIS: ICD-10-CM

## 2020-03-16 DIAGNOSIS — G89.29 CHRONIC MIDLINE LOW BACK PAIN WITHOUT SCIATICA: ICD-10-CM

## 2020-03-16 NOTE — TELEPHONE ENCOUNTER
----- Message from Demetra Starr sent at 3/16/2020  4:08 PM CDT -----  Type:  RX Refill Request    Who Called:  patient  Refill or New Rx:  refill  RX Name and Strength:  oxyCODONE-acetaminophen (PERCOCET)  mg per tablet  How is the patient currently taking it? (ex. 1XDay):  Sig - Route: Take 1 tablet by mouth every 12 (twelve) hours as needed for Pain. - Oral  Is this a 30 day or 90 day RX:  30  Preferred Pharmacy with phone number:    [facility], [place] - [address]  [address]  [facility] [address]  [facility] [address]  Phone: [phone] Fax: [phone]  Local or Mail Order:  local  Ordering Provider:  Ulices Maya Call Back Number:  [phone]  Additional Information:

## 2020-03-17 RX ORDER — OXYCODONE AND ACETAMINOPHEN 10; 325 MG/1; MG/1
1 TABLET ORAL EVERY 12 HOURS PRN
Qty: 60 TABLET | Refills: 0 | Status: SHIPPED | OUTPATIENT
Start: 2020-03-17 | End: 2020-04-13 | Stop reason: SDUPTHER

## 2020-03-23 ENCOUNTER — EXTERNAL HOME HEALTH (OUTPATIENT)
Dept: HOME HEALTH SERVICES | Facility: HOSPITAL | Age: 25
End: 2020-03-23
Payer: COMMERCIAL

## 2020-04-01 ENCOUNTER — EXTERNAL HOME HEALTH (OUTPATIENT)
Dept: HOME HEALTH SERVICES | Facility: HOSPITAL | Age: 25
End: 2020-04-01
Payer: COMMERCIAL

## 2020-04-13 DIAGNOSIS — G35 MULTIPLE SCLEROSIS: ICD-10-CM

## 2020-04-13 DIAGNOSIS — G89.29 CHRONIC MIDLINE LOW BACK PAIN WITHOUT SCIATICA: ICD-10-CM

## 2020-04-13 DIAGNOSIS — M54.50 CHRONIC MIDLINE LOW BACK PAIN WITHOUT SCIATICA: ICD-10-CM

## 2020-04-13 NOTE — TELEPHONE ENCOUNTER
----- Message from Emy Fabian sent at 4/13/2020  2:33 PM CDT -----  Contact: patient  Type:  RX Refill Request    Who Called:  patient  Refill or New Rx:  refill  RX Name and Strength:  oxyCODONE-acetaminophen (PERCOCET)  mg per tablet  How is the patient currently taking it? (ex. 1XDay):  Every 12 hours  Is this a 30 day or 90 day RX:  30  Preferred Pharmacy with phone number:    [facility], [place] - [address]  [address]  [facility] [address]  [facility] [address]  Phone: [phone] Fax: [phone]    Local or Mail Order:  local  Ordering Provider:  Ulices Maya Call Back Number:  [phone]      Additional Information:  Patient would also like a call back from the provider.  She states she has questions for the provider.

## 2020-04-14 ENCOUNTER — TELEPHONE (OUTPATIENT)
Dept: FAMILY MEDICINE | Facility: CLINIC | Age: 25
End: 2020-04-14

## 2020-04-14 RX ORDER — OXYCODONE AND ACETAMINOPHEN 10; 325 MG/1; MG/1
1 TABLET ORAL EVERY 12 HOURS PRN
Qty: 60 TABLET | Refills: 0 | Status: SHIPPED | OUTPATIENT
Start: 2020-04-14 | End: 2020-05-12 | Stop reason: SDUPTHER

## 2020-04-14 NOTE — TELEPHONE ENCOUNTER
Called patient as a received a phone call that she had some questions for me. I called patient and no answer.

## 2020-04-14 NOTE — TELEPHONE ENCOUNTER
----- Message from Cesar Camp sent at 4/14/2020 11:01 AM CDT -----  Type:  Patient Returning Call    Who Called:  Patient  Who Left Message for Patient:  Dr. Elena  Does the patient know what this is regarding?:    Best Call Back Number:  [phone]  Additional Information:

## 2020-04-15 ENCOUNTER — DOCUMENT SCAN (OUTPATIENT)
Dept: HOME HEALTH SERVICES | Facility: HOSPITAL | Age: 25
End: 2020-04-15
Payer: COMMERCIAL

## 2020-04-16 ENCOUNTER — PATIENT MESSAGE (OUTPATIENT)
Dept: FAMILY MEDICINE | Facility: CLINIC | Age: 25
End: 2020-04-16

## 2020-04-17 ENCOUNTER — PATIENT MESSAGE (OUTPATIENT)
Dept: FAMILY MEDICINE | Facility: CLINIC | Age: 25
End: 2020-04-17

## 2020-04-22 ENCOUNTER — DOCUMENT SCAN (OUTPATIENT)
Dept: HOME HEALTH SERVICES | Facility: HOSPITAL | Age: 25
End: 2020-04-22
Payer: COMMERCIAL

## 2020-05-12 ENCOUNTER — OFFICE VISIT (OUTPATIENT)
Dept: FAMILY MEDICINE | Facility: CLINIC | Age: 25
End: 2020-05-12
Payer: COMMERCIAL

## 2020-05-12 DIAGNOSIS — M54.50 CHRONIC MIDLINE LOW BACK PAIN WITHOUT SCIATICA: ICD-10-CM

## 2020-05-12 DIAGNOSIS — G89.29 CHRONIC MIDLINE LOW BACK PAIN WITHOUT SCIATICA: ICD-10-CM

## 2020-05-12 DIAGNOSIS — Z76.0 ENCOUNTER FOR MEDICATION REFILL: ICD-10-CM

## 2020-05-12 DIAGNOSIS — G35 MULTIPLE SCLEROSIS: ICD-10-CM

## 2020-05-12 DIAGNOSIS — G89.29 CHRONIC MIDLINE LOW BACK PAIN WITHOUT SCIATICA: Primary | ICD-10-CM

## 2020-05-12 DIAGNOSIS — M54.50 CHRONIC MIDLINE LOW BACK PAIN WITHOUT SCIATICA: Primary | ICD-10-CM

## 2020-05-12 PROCEDURE — 99214 PR OFFICE/OUTPT VISIT, EST, LEVL IV, 30-39 MIN: ICD-10-PCS | Mod: GT,,, | Performed by: FAMILY MEDICINE

## 2020-05-12 PROCEDURE — 99214 OFFICE O/P EST MOD 30 MIN: CPT | Mod: GT,,, | Performed by: FAMILY MEDICINE

## 2020-05-12 RX ORDER — OXYCODONE AND ACETAMINOPHEN 10; 325 MG/1; MG/1
1 TABLET ORAL EVERY 12 HOURS PRN
Qty: 60 TABLET | Refills: 0 | Status: SHIPPED | OUTPATIENT
Start: 2020-05-12 | End: 2020-06-10 | Stop reason: SDUPTHER

## 2020-05-12 RX ORDER — OXYCODONE AND ACETAMINOPHEN 10; 325 MG/1; MG/1
1 TABLET ORAL EVERY 12 HOURS PRN
Qty: 60 TABLET | Refills: 0 | Status: CANCELLED | OUTPATIENT
Start: 2020-05-12

## 2020-05-12 NOTE — TELEPHONE ENCOUNTER
This patient needs an appointment to refill her controlled substance. Has not been seen since january. Please make a virtual video visit or in person visit. Thank you

## 2020-05-12 NOTE — PROGRESS NOTES
Walks on her own  Home PT on hold right now    [place] doctor was supposed to have an appt on April     Percocet refill pain eery 12 hours  Takes is about twice a day  7/10 w/out  2/10    Answers for HPI/ROS submitted by the patient on 5/12/2020   activity change: No  unexpected weight change: No  neck pain: No  hearing loss: No  rhinorrhea: No  trouble swallowing: No  eye discharge: No  visual disturbance: No  chest tightness: No  wheezing: No  chest pain: No  palpitations: No  blood in stool: No  constipation: No  vomiting: No  diarrhea: No  polydipsia: No  polyuria: No  difficulty urinating: No  hematuria: No  menstrual problem: No  dysuria: No  joint swelling: No  arthralgias: No  headaches: No  weakness: No  confusion: No  dysphoric mood: No

## 2020-05-12 NOTE — PROGRESS NOTES
[facility] Note    Subjective    The patient location is: home  The chief complaint leading to consultation is: medication refill   Visit type: audiovisual  Total time spent with patient: 10 mins  Each patient to whom he or she provides medical services by telemedicine is:  (1) informed of the relationship between the physician and patient and the respective role of any other health care provider with respect to management of the patient; and (2) notified that he or she may decline to receive medical services by telemedicine and may withdraw from such care at any time.    Ms. Del Angel is a 25 y.o. female who presents to clinic for pain medication refill.       Patient has a history of MS and chronic low back pain.   She is currently on percocet 10mg BID prn.   States that she has been taking the medication BID.   Pain without th medication is a 7/10 and after taking the medicine decreases to 2/10.   She is able to ambulate with a walker. States that she is getting better. Home PT is on hold due to COVID-19.   She is followed by a specialist in [place] for [place]. Was to have an appt in April but canceled due to COVID.     PMDYLON Remy has a past medical history of MS (multiple sclerosis) (06/2018).   PSXH Jonel has a past surgical history that includes [place] tooth extraction (2015).   CECILY Remy's family history includes No Known Problems in her father and mother.   MITCH Remy reports that she has never smoked. She has never used smokeless tobacco. She reports that she drank alcohol. She reports that she has current or past drug history.   EMILIANO Remy is allergic to penicillins; trazodone; and ibuprofen.   AIXA Remy has a current medication list which includes the following prescription(s): bismuth tribrom-petrolatum,wh, carbamazepine, clonazepam, oxybutynin, and oxycodone-acetaminophen.     Review of Systems   Constitutional: Negative for activity change and unexpected weight change.   HENT: Negative for hearing loss,  rhinorrhea and trouble swallowing.    Eyes: Negative for discharge and visual disturbance.   Respiratory: Negative for chest tightness and wheezing.    Cardiovascular: Negative for chest pain and palpitations.   Gastrointestinal: Negative for blood in stool, constipation, diarrhea and vomiting.   Endocrine: Negative for polydipsia and polyuria.   Genitourinary: Negative for difficulty urinating, dysuria, hematuria and menstrual problem.   Musculoskeletal: Negative for arthralgias, joint swelling and neck pain.   Neurological: Negative for weakness and headaches.   Psychiatric/Behavioral: Negative for confusion and dysphoric mood.     Objective     There were no vitals taken for this visit.    Physical Exam   Constitutional: She is oriented to person, place, and time. She appears well-developed and well-nourished. No distress.   HENT:   Head: Normocephalic and atraumatic.   Right Ear: External ear normal.   Left Ear: External ear normal.   Eyes: Right eye exhibits no discharge. Left eye exhibits no discharge.   Pulmonary/Chest: Effort normal. No respiratory distress.   Neurological: She is alert and oriented to person, place, and time.   Skin: She is not diaphoretic.   Psychiatric: She has a normal mood and affect. Her behavior is normal. Judgment and thought content normal.      Assessment/Plan     Jonel was seen today for medication refill.    Diagnoses and all orders for this visit:    Chronic midline low back pain without sciatica  -     oxyCODONE-acetaminophen (PERCOCET)  mg per tablet; Take 1 tablet by mouth every 12 (twelve) hours as needed for Pain.    Multiple sclerosis  -     oxyCODONE-acetaminophen (PERCOCET)  mg per tablet; Take 1 tablet by mouth every 12 (twelve) hours as needed for Pain.    Encounter for medication refill  -     oxyCODONE-acetaminophen (PERCOCET)  mg per tablet; Take 1 tablet by mouth every 12 (twelve) hours as needed for Pain.      - reviewed by me and no  suspicious activity was noted. No abuse or concerns noted. Will refill medicine as shown given that patient receives greater than 30% benefit from medicine and keeps patient functional and able to complete ADL's.  -instructed to call MS specialist and reschedule follow up appt.     Follow up in about 3 months (around 8/12/2020).    Future Appointments   Date Time Provider Department Center   8/12/2020 10:00 AM Caridad Elena MD [facility]       Caridad Elena MD  Family Medicine  [facility]

## 2020-05-20 ENCOUNTER — PATIENT MESSAGE (OUTPATIENT)
Dept: ADMINISTRATIVE | Facility: HOSPITAL | Age: 25
End: 2020-05-20

## 2020-07-10 DIAGNOSIS — M54.50 CHRONIC MIDLINE LOW BACK PAIN WITHOUT SCIATICA: ICD-10-CM

## 2020-07-10 DIAGNOSIS — G35 MULTIPLE SCLEROSIS: ICD-10-CM

## 2020-07-10 DIAGNOSIS — G89.29 CHRONIC MIDLINE LOW BACK PAIN WITHOUT SCIATICA: ICD-10-CM

## 2020-07-10 DIAGNOSIS — Z76.0 ENCOUNTER FOR MEDICATION REFILL: ICD-10-CM

## 2020-07-12 RX ORDER — OXYCODONE AND ACETAMINOPHEN 10; 325 MG/1; MG/1
1 TABLET ORAL EVERY 12 HOURS PRN
Qty: 60 TABLET | Refills: 0 | Status: SHIPPED | OUTPATIENT
Start: 2020-07-12 | End: 2020-08-12 | Stop reason: SDUPTHER

## 2020-07-29 ENCOUNTER — PATIENT OUTREACH (OUTPATIENT)
Dept: ADMINISTRATIVE | Facility: HOSPITAL | Age: 25
End: 2020-07-29

## 2020-08-12 DIAGNOSIS — M54.50 CHRONIC MIDLINE LOW BACK PAIN WITHOUT SCIATICA: ICD-10-CM

## 2020-08-12 DIAGNOSIS — G89.29 CHRONIC MIDLINE LOW BACK PAIN WITHOUT SCIATICA: ICD-10-CM

## 2020-08-12 DIAGNOSIS — Z76.0 ENCOUNTER FOR MEDICATION REFILL: ICD-10-CM

## 2020-08-12 DIAGNOSIS — G35 MULTIPLE SCLEROSIS: ICD-10-CM

## 2020-08-12 RX ORDER — OXYCODONE AND ACETAMINOPHEN 10; 325 MG/1; MG/1
1 TABLET ORAL EVERY 12 HOURS PRN
Qty: 60 TABLET | Refills: 0 | Status: SHIPPED | OUTPATIENT
Start: 2020-08-12 | End: 2020-09-10 | Stop reason: SDUPTHER

## 2020-08-25 ENCOUNTER — OFFICE VISIT (OUTPATIENT)
Dept: FAMILY MEDICINE | Facility: CLINIC | Age: 25
End: 2020-08-25
Payer: MEDICAID

## 2020-08-25 DIAGNOSIS — G35 MULTIPLE SCLEROSIS: ICD-10-CM

## 2020-08-25 DIAGNOSIS — G89.29 CHRONIC MIDLINE LOW BACK PAIN WITHOUT SCIATICA: Primary | ICD-10-CM

## 2020-08-25 DIAGNOSIS — M54.50 CHRONIC MIDLINE LOW BACK PAIN WITHOUT SCIATICA: Primary | ICD-10-CM

## 2020-08-25 PROCEDURE — 99213 OFFICE O/P EST LOW 20 MIN: CPT | Mod: GT,,, | Performed by: FAMILY MEDICINE

## 2020-08-25 PROCEDURE — 99213 PR OFFICE/OUTPT VISIT, EST, LEVL III, 20-29 MIN: ICD-10-PCS | Mod: GT,,, | Performed by: FAMILY MEDICINE

## 2020-08-25 NOTE — PROGRESS NOTES
[facility] - Clinic Note    Subjective    The patient location is: home  The chief complaint leading to consultation is: follow up    Visit type: audiovisual    Face to Face time with patient: 5  minutes of total time spent on the encounter, which includes face to face time and non-face to face time preparing to see the patient (eg, review of tests), Obtaining and/or reviewing separately obtained history, Documenting clinical information in the electronic or other health record, Independently interpreting results (not separately reported) and communicating results to the patient/family/caregiver, or Care coordination (not separately reported).        Each patient to whom he or she provides medical services by telemedicine is:  (1) informed of the relationship between the physician and patient and the respective role of any other health care provider with respect to management of the patient; and (2) notified that he or she may decline to receive medical services by telemedicine and may withdraw from such care at any time.    Notes:     Ms. Del Angel is a 25 y.o. female who presents for a virtual visit.     Patient has a history of MS and chronic low back pain.   She is currently on percocet 10mg BID prn.   States that she has been taking the medication and finds relief.  No flares in MS since last visit.   She is followed by a specialist in [place] for [place]. Was to have an appt in April but canceled due to COVID. She has been trying to call to make an appt but unable to get in touch with them. She states that her meds were refilled though.  Still has wound on right shoulder. No drainage.    PM Jonel has a past medical history of MS (multiple sclerosis) (06/2018).   PSXH Jonel has a past surgical history that includes [place] tooth extraction (2015).    Jonel's family history includes No Known Problems in her father and mother.   MITCH Remy reports that she has never smoked. She has never used smokeless tobacco. She  reports previous alcohol use. She reports previous drug use.   EMILIANO Remy is allergic to penicillins; trazodone; and ibuprofen.   AIXA Remy has a current medication list which includes the following prescription(s): bismuth tribrom-petrolatum,wh, carbamazepine, clonazepam, oxybutynin, and oxycodone-acetaminophen.     Review of Systems   Constitutional: Negative for activity change and unexpected weight change.   HENT: Negative for hearing loss, rhinorrhea and trouble swallowing.    Eyes: Negative for discharge and visual disturbance.   Respiratory: Negative for chest tightness and wheezing.    Cardiovascular: Negative for chest pain and palpitations.   Gastrointestinal: Negative for blood in stool, constipation, diarrhea and vomiting.   Endocrine: Negative for polydipsia and polyuria.   Genitourinary: Negative for difficulty urinating, dysuria, hematuria and menstrual problem.   Musculoskeletal: Positive for arthralgias. Negative for joint swelling and neck pain.   Neurological: Negative for weakness and headaches.   Psychiatric/Behavioral: Negative for confusion and dysphoric mood.     Objective     There were no vitals taken for this visit.    Physical Exam   Constitutional:  Non-toxic appearance. She does not appear ill. No distress.   Eyes: Right eye exhibits no discharge. Left eye exhibits no discharge.   Pulmonary/Chest: Effort normal. No respiratory distress.   Speaks in complete sentences without notable SOB. No tachypnea.    Abdominal: Normal appearance.   Neurological: She is alert.   Skin: She is not diaphoretic.   Psychiatric: Her behavior is normal. Mood, judgment and thought content normal.      Assessment/Plan     Diagnoses and all orders for this visit:    Chronic midline low back pain without sciatica    Multiple sclerosis    -instructed to make appt and follow up with specialist.   -informed to send pic of right shoulder wound.   -stable at this time.    Follow up in about 3 months (around  11/25/2020).     Caridad Elena MD  Family Medicine  [facility]

## 2020-08-31 ENCOUNTER — TELEPHONE (OUTPATIENT)
Dept: FAMILY MEDICINE | Facility: CLINIC | Age: 25
End: 2020-08-31

## 2020-08-31 NOTE — TELEPHONE ENCOUNTER
Pt has an appointment on 9/1/2020 to discuss issues with her PCP.          i----- Message from Vinita Weaver sent at 8/31/2020 12:35 PM CDT -----  Patient's mom, Gretta, is calling to report that Jonel is getting worse. She cannot even walk now.  Mom also wants you to know that Jonel tested positive for covid 19 in July. Please call mom with advice at [phone].  Thank you!

## 2020-09-01 ENCOUNTER — OFFICE VISIT (OUTPATIENT)
Dept: FAMILY MEDICINE | Facility: CLINIC | Age: 25
End: 2020-09-01
Payer: MEDICAID

## 2020-09-01 VITALS
OXYGEN SATURATION: 98 % | DIASTOLIC BLOOD PRESSURE: 77 MMHG | HEART RATE: 116 BPM | RESPIRATION RATE: 15 BRPM | TEMPERATURE: 98 F | SYSTOLIC BLOOD PRESSURE: 112 MMHG | BODY MASS INDEX: 26.66 KG/M2 | WEIGHT: 160 LBS | HEIGHT: 65 IN

## 2020-09-01 DIAGNOSIS — G35 MULTIPLE SCLEROSIS: Primary | ICD-10-CM

## 2020-09-01 PROCEDURE — 99213 OFFICE O/P EST LOW 20 MIN: CPT | Mod: S$GLB,,, | Performed by: FAMILY MEDICINE

## 2020-09-01 PROCEDURE — 99213 PR OFFICE/OUTPT VISIT, EST, LEVL III, 20-29 MIN: ICD-10-PCS | Mod: S$GLB,,, | Performed by: FAMILY MEDICINE

## 2020-09-02 ENCOUNTER — TELEPHONE (OUTPATIENT)
Dept: FAMILY MEDICINE | Facility: CLINIC | Age: 25
End: 2020-09-02

## 2020-09-02 NOTE — TELEPHONE ENCOUNTER
Return the call to Mrs Tay inform she has left for the day . Will call on tomorrow.          ----- Message from Milton Howell sent at 9/2/2020  3:31 PM CDT -----  Regarding: Maggi from [employer]  Type: Needs Medical Advice    Who Called:  Maggi Maya Call Back Number: [phone]  Additional Information: Company needs Dr Bishop's Medicaid ID (or a Dr in clinic) number in order to fill Pts order. Please call to discuss.

## 2020-09-10 DIAGNOSIS — Z76.0 ENCOUNTER FOR MEDICATION REFILL: ICD-10-CM

## 2020-09-10 DIAGNOSIS — G35 MULTIPLE SCLEROSIS: ICD-10-CM

## 2020-09-10 DIAGNOSIS — G89.29 CHRONIC MIDLINE LOW BACK PAIN WITHOUT SCIATICA: ICD-10-CM

## 2020-09-10 DIAGNOSIS — M54.50 CHRONIC MIDLINE LOW BACK PAIN WITHOUT SCIATICA: ICD-10-CM

## 2020-09-10 RX ORDER — OXYCODONE AND ACETAMINOPHEN 10; 325 MG/1; MG/1
1 TABLET ORAL EVERY 12 HOURS PRN
Qty: 60 TABLET | Refills: 0 | Status: SHIPPED | OUTPATIENT
Start: 2020-09-10 | End: 2020-10-07 | Stop reason: SDUPTHER

## 2020-10-07 DIAGNOSIS — Z76.0 ENCOUNTER FOR MEDICATION REFILL: ICD-10-CM

## 2020-10-07 DIAGNOSIS — M54.50 CHRONIC MIDLINE LOW BACK PAIN WITHOUT SCIATICA: ICD-10-CM

## 2020-10-07 DIAGNOSIS — G35 MULTIPLE SCLEROSIS: ICD-10-CM

## 2020-10-07 DIAGNOSIS — G89.29 CHRONIC MIDLINE LOW BACK PAIN WITHOUT SCIATICA: ICD-10-CM

## 2020-10-07 RX ORDER — OXYCODONE AND ACETAMINOPHEN 10; 325 MG/1; MG/1
1 TABLET ORAL EVERY 12 HOURS PRN
Qty: 60 TABLET | Refills: 0 | Status: SHIPPED | OUTPATIENT
Start: 2020-10-07 | End: 2020-12-01 | Stop reason: SDUPTHER

## 2020-10-07 NOTE — TELEPHONE ENCOUNTER
- reviewed by me and no suspicious activity was noted. No abuse or concerns noted. Will refill medicine as shown given that patient receives greater than 30% benefit from medicine and keeps patient functional and able to complete ADL's.

## 2020-10-11 NOTE — PROGRESS NOTES
"[facility] - Clinic Note    Subjective      Ms. Del Angel is a 25 y.o. female who presents to clinic for complaints of weakness.     Patient is accompanied today with her mother.     She reports that over the past weeks she has had increase weakness in all extremitites and urinary incontience.  She called her MS specialist in [place] and states that she is having a flare of her MS and needs inpatient treatment.   Patient wanted to be seen today to make sure that she needs admission.   She is usually admitted to [facility] due to insurance and neurology is known to her there.       PMDYLON Remy has a past medical history of MS (multiple sclerosis) (06/2018).   PSXH Jonel has a past surgical history that includes [place] tooth extraction (2015).   CECILY Remy's family history includes No Known Problems in her father and mother.   MITCH Remy reports that she has never smoked. She has never used smokeless tobacco. She reports previous alcohol use. She reports previous drug use.   EMILIANO Remy is allergic to penicillins; trazodone; and ibuprofen.   AIXA Remy has a current medication list which includes the following prescription(s): carbamazepine, clonazepam, and oxycodone-acetaminophen.     Review of Systems   Constitutional: Positive for activity change and fatigue. Negative for appetite change, chills and fever.   Eyes: Negative for visual disturbance.   Respiratory: Negative for cough and shortness of breath.    Cardiovascular: Negative for chest pain and leg swelling.   Gastrointestinal: Negative for abdominal pain.   Neurological: Positive for weakness and numbness. Negative for seizures.   Psychiatric/Behavioral: Negative for confusion.     Objective     /77   Pulse (!) 116   Temp 97.8 °F (36.6 °C) (Temporal)   Resp 15   Ht 5' 5" (1.651 m)   Wt 72.6 kg (160 lb)   LMP 08/31/2020   SpO2 98%   BMI 26.63 kg/m²     Physical Exam   Constitutional: She appears well-developed and well-nourished.  Non-toxic appearance. " She does not appear ill. No distress.   In wheelchair   HENT:   Head: Normocephalic and atraumatic.   Eyes: Right eye exhibits no discharge. Left eye exhibits no discharge.   Neck: Neck supple.   Cardiovascular: Normal rate, regular rhythm, normal heart sounds and normal pulses. Exam reveals no gallop and no friction rub.   No murmur heard.  Pulmonary/Chest: Effort normal and breath sounds normal. No respiratory distress. She has no wheezes. She has no rhonchi. She has no rales.   Abdominal: Normal appearance.   Lymphadenopathy:     She has no cervical adenopathy.   Neurological: She is alert.   Skin: Skin is warm and dry. Capillary refill takes less than 2 seconds. She is not diaphoretic.   Psychiatric: Her behavior is normal. Mood, judgment and thought content normal.   Vitals reviewed.     Assessment/Plan     Jonel was seen today for extremity weakness.    Diagnoses and all orders for this visit:    Multiple sclerosis  -agree that she is having a flare of her MS and needs IV steroid. Recommended that she go to [facility] for admission as neurology is known to her there.  -follow up after hospital discharge.   -mother and patient verbalized understanding.      Caridad Elena MD  Family Medicine  [facility]

## 2020-10-23 ENCOUNTER — TELEPHONE (OUTPATIENT)
Dept: SURGERY | Facility: CLINIC | Age: 25
End: 2020-10-23

## 2020-10-23 NOTE — TELEPHONE ENCOUNTER
Returned call. Patient reports that after last visit she did go to [facility], they found her to have an UTI and gave her some antibiotics, but did not receive and steroids.  Patient reports that she is still having weakness and pain and that she is requesting an increase in her pain medications.     ----- Message from Cherry Vazquez sent at 10/23/2020  1:13 PM CDT -----  Regarding: update  Contact: patient  Patient want to speak with a nurse regarding update on health and questions about last appointment please call back at [phone] (cell)     Case number [id number]

## 2020-11-10 ENCOUNTER — TELEPHONE (OUTPATIENT)
Dept: FAMILY MEDICINE | Facility: CLINIC | Age: 25
End: 2020-11-10

## 2020-11-10 NOTE — TELEPHONE ENCOUNTER
----- Message from Candy Cabrera sent at 11/10/2020 12:57 PM CST -----  Contact: [facility]  Type: Needs Medical Advice  Who Called:  nurse [facility]ina with [facility]  Symptoms (please be specific):    How long has patient had these symptoms:    Pharmacy name and phone #:    Best Call Back Number: [phone]  Additional Information: Yina requesting call back regarding patient's antibiotic for UTI states patient is finishing both of them today. Patient was admitted to home health. Wanted to let the doctor know because there could be potential reaction with regular medication

## 2020-11-10 NOTE — TELEPHONE ENCOUNTER
Called Mrs Aguilar at [facility] no answer, left message , pt is not to take the klonopin until the antibitoic or completed.    Cyclophosphamide Pregnancy And Lactation Text: This medication is Pregnancy Category D and it isn't considered safe during pregnancy. This medication is excreted in breast milk.

## 2020-11-10 NOTE — TELEPHONE ENCOUNTER
Called from [facility], pt Home[facility] Nurse,    Pt D/C from hospital with two antibiotic d/t UTI  Doxycycline and Linezolid. The Linezolid may have a interaaction  with the pt Klonopin.    The antibiotic pt orders are take every 12 hrs, nurse states pt has one more dose of the antibiotic.

## 2020-11-11 NOTE — TELEPHONE ENCOUNTER
Spoke with Yina with [facility], advised of MD communication. MAUU and relayed to patient and sister.

## 2020-12-01 ENCOUNTER — TELEPHONE (OUTPATIENT)
Dept: FAMILY MEDICINE | Facility: CLINIC | Age: 25
End: 2020-12-01

## 2020-12-01 ENCOUNTER — OFFICE VISIT (OUTPATIENT)
Dept: FAMILY MEDICINE | Facility: CLINIC | Age: 25
End: 2020-12-01
Payer: MEDICAID

## 2020-12-01 VITALS
WEIGHT: 126 LBS | HEART RATE: 121 BPM | RESPIRATION RATE: 17 BRPM | TEMPERATURE: 97 F | SYSTOLIC BLOOD PRESSURE: 110 MMHG | DIASTOLIC BLOOD PRESSURE: 80 MMHG | OXYGEN SATURATION: 98 % | BODY MASS INDEX: 20.99 KG/M2 | HEIGHT: 65 IN

## 2020-12-01 DIAGNOSIS — G89.29 CHRONIC MIDLINE LOW BACK PAIN WITHOUT SCIATICA: ICD-10-CM

## 2020-12-01 DIAGNOSIS — G35 MULTIPLE SCLEROSIS: ICD-10-CM

## 2020-12-01 DIAGNOSIS — Z09 HOSPITAL DISCHARGE FOLLOW-UP: Primary | ICD-10-CM

## 2020-12-01 DIAGNOSIS — M54.50 CHRONIC MIDLINE LOW BACK PAIN WITHOUT SCIATICA: ICD-10-CM

## 2020-12-01 PROCEDURE — 99214 PR OFFICE/OUTPT VISIT, EST, LEVL IV, 30-39 MIN: ICD-10-PCS | Mod: S$GLB,,, | Performed by: FAMILY MEDICINE

## 2020-12-01 PROCEDURE — 99214 OFFICE O/P EST MOD 30 MIN: CPT | Mod: S$GLB,,, | Performed by: FAMILY MEDICINE

## 2020-12-01 RX ORDER — OXYCODONE AND ACETAMINOPHEN 10; 325 MG/1; MG/1
1 TABLET ORAL EVERY 8 HOURS PRN
Qty: 60 TABLET | Refills: 0 | Status: SHIPPED | OUTPATIENT
Start: 2020-12-01 | End: 2020-12-01 | Stop reason: SDUPTHER

## 2020-12-01 RX ORDER — OXYCODONE AND ACETAMINOPHEN 10; 325 MG/1; MG/1
1 TABLET ORAL EVERY 8 HOURS PRN
Qty: 90 TABLET | Refills: 0 | Status: SHIPPED | OUTPATIENT
Start: 2020-12-01 | End: 2021-03-31 | Stop reason: SDUPTHER

## 2020-12-01 NOTE — PROGRESS NOTES
[facility] - Clinic Note    Subjective      MsRosa M Del Angel is a 25 y.o. female who presents to clinic for a hospital follow up    Since patient's last visit, she has been admitted to the hospital about 3 times.   She was admitted to [facility] in September due to an MS flare. States that she received IV steroids and plasmapheresis.   Next admission was in October for 2 weeks in [place] which is where her MS specialist is located. She had another flare at that time.   Her last admission was last month at [facility] due to loss of vision. She was told that this was due to progression of her MS. She is unsure what medication se received while inpatient and states that she is now having home health come once a week to give her a medication.   She states that her vision is slowly returning. She is able to see color now.   Does not have a follow up with her MS specialist scheduled. He is supposed to call her about starting a trial for a medication to help decrease the progression of her MS.   Still takes klonopin and carbamazepine from her specialist.   She needs a refill on percocet. Has been out for the past 2 months.   Requesting an increase of the frequency as her pain has increased due to the flares and progression. She is also unable to ambulate on her own.    PMDYLON Remy has a past medical history of MS (multiple sclerosis) (06/2018).   PSXH Jonel has a past surgical history that includes [place] tooth extraction (2015).   CECILY Remy's family history includes No Known Problems in her father and mother.   MITCH Remy reports that she has never smoked. She has never used smokeless tobacco. She reports previous alcohol use. She reports previous drug use.   EMILIANO Remy is allergic to penicillins; trazodone; and ibuprofen.   AIXA Remy has a current medication list which includes the following prescription(s): carbamazepine, clonazepam, and oxycodone-acetaminophen.     Review of Systems   Constitutional: Negative for activity  "change, appetite change, chills, fatigue and fever.   Eyes: Negative for visual disturbance.   Respiratory: Negative for cough and shortness of breath.    Cardiovascular: Negative for chest pain, palpitations and leg swelling.   Gastrointestinal: Negative for abdominal pain.   Musculoskeletal: Positive for arthralgias and back pain.   Neurological: Negative for dizziness and headaches.   Psychiatric/Behavioral: Negative for confusion.     Objective     /80 (BP Location: Right arm, Patient Position: Sitting, BP Method: Medium (Manual))   Pulse (!) 121   Temp 96.7 °F (35.9 °C) (Temporal)   Resp 17   Ht 5' 5" (1.651 m)   Wt 57.2 kg (126 lb)   SpO2 98%   BMI 20.97 kg/m²     Physical Exam   Constitutional: She appears well-developed and well-nourished.  Non-toxic appearance. She does not appear ill. No distress.   In wheelchair   HENT:   Head: Normocephalic and atraumatic.   Eyes: Right eye exhibits no discharge. Left eye exhibits no discharge.   Neck: Neck supple.   Cardiovascular: Normal rate, regular rhythm, normal heart sounds and normal pulses. Exam reveals no gallop and no friction rub.   No murmur heard.  Pulmonary/Chest: Effort normal and breath sounds normal. No respiratory distress. She has no wheezes. She has no rhonchi. She has no rales.   Abdominal: Normal appearance.   Lymphadenopathy:     She has no cervical adenopathy.   Neurological: She is alert.   Skin: Skin is warm and dry. Capillary refill takes less than 2 seconds. She is not diaphoretic.   Open wound on the right shoulder. Healing well. Healthy tissue. No discharge noted.   Psychiatric: Her behavior is normal. Mood, judgment and thought content normal.   Vitals reviewed.     Assessment/Plan     Jonel was seen today for hospital follow up.    Diagnoses and all orders for this visit:    Hospital discharge follow-up  -need to obtain records from recent hospitalization from [facility] and review.     Multiple sclerosis  -     " oxyCODONE-acetaminophen (PERCOCET)  mg per tablet; Take 1 tablet by mouth every 8 (eight) hours as needed for Pain.    Chronic midline low back pain without sciatica  -     oxyCODONE-acetaminophen (PERCOCET)  mg per tablet; Take 1 tablet by mouth every 8 (eight) hours as needed for Pain.    -increase percocet to q8h.  reviewed by me and no suspicious activity was noted. No abuse or concerns noted. Will refill medicine as shown given that patient receives greater than 30% benefit from medicine and keeps patient functional and able to complete ADL's.  -instructed to contact MS specialist and make an appt.      Follow up in about 6 weeks (around 1/12/2021).    Future Appointments   Date Time Provider Department Center   1/20/2021 11:00 AM Caridad Elena MD [facility]       Caridad Elena MD  Family Medicine  [facility]

## 2020-12-01 NOTE — TELEPHONE ENCOUNTER
----- Message from Pascale Gtz, Patient Care Assistant sent at 12/1/2020  2:22 PM CST -----  Regarding: speak to nurse  Contact: patient  Type: Needs Medical Advice  Who Called:  patient  Symptoms (please be specific):  na  How long has patient had these symptoms:  na  Pharmacy name and phone #:    [facility] - [place], [place] - [address]  [address]  [address]  [facility] [address]  Phone: [phone] Fax: [phone]        Best Call Back Number: [phone]    Additional Information: patient just left office and stated the doctor forgot to  increase her medication , please send new RX  with the  new increased amount to above pharmacy  thanks

## 2020-12-07 ENCOUNTER — TELEPHONE (OUTPATIENT)
Dept: FAMILY MEDICINE | Facility: CLINIC | Age: 25
End: 2020-12-07

## 2020-12-07 NOTE — TELEPHONE ENCOUNTER
----- Message from Milton Howell sent at 12/7/2020  2:54 PM CST -----  Regarding: Adia with [facility]  Type: Needs Medical Advice    Who Called:  Adia with [facility]    Best Call Back Number: [phone]  Additional Information: Waiting on form to be returned by Dr Bishop so pt can stay in program. Form has been faxed multiple times to correct fax of [phone]; however, has yet to be returned as of date of this message. First was sent 10/23, and 4 other times before today. Sending again today. Please return to fax [phone] before 12/10 or pt will be removed from program.

## 2020-12-14 ENCOUNTER — LAB VISIT (OUTPATIENT)
Dept: LAB | Facility: HOSPITAL | Age: 25
End: 2020-12-14
Attending: INTERNAL MEDICINE
Payer: MEDICAID

## 2020-12-14 DIAGNOSIS — G36.0 NEUROMYELITIS OPTICA: Primary | ICD-10-CM

## 2020-12-14 PROCEDURE — 87340 HEPATITIS B SURFACE AG IA: CPT

## 2020-12-14 PROCEDURE — 36415 COLL VENOUS BLD VENIPUNCTURE: CPT

## 2020-12-14 PROCEDURE — 86803 HEPATITIS C AB TEST: CPT

## 2020-12-14 PROCEDURE — 86705 HEP B CORE ANTIBODY IGM: CPT

## 2020-12-15 LAB
HBV CORE IGM SERPL QL IA: NEGATIVE
HBV SURFACE AG SERPL QL IA: NEGATIVE
HCV AB SERPL QL IA: NEGATIVE

## 2020-12-21 DIAGNOSIS — M54.50 CHRONIC MIDLINE LOW BACK PAIN WITHOUT SCIATICA: ICD-10-CM

## 2020-12-21 DIAGNOSIS — G35 MULTIPLE SCLEROSIS: ICD-10-CM

## 2020-12-21 DIAGNOSIS — G89.29 CHRONIC MIDLINE LOW BACK PAIN WITHOUT SCIATICA: ICD-10-CM

## 2020-12-21 NOTE — TELEPHONE ENCOUNTER
----- Message from Murray Villagran sent at 12/21/2020  9:44 AM CST -----  Type:  RX Refill Request    Who Called:  Patient  Refill or New Rx:  Refill  RX Name and Strength:  oxyCODONE-acetaminophen (PERCOCET)  mg per tablet  How is the patient currently taking it? (ex. 1XDay):  As ordered  Is this a 30 day or 90 day RX:  90  Preferred Pharmacy with phone number:    [facility], [place] - [address]  [address] [address]  Phone: [phone] Fax: [phone]  Local or Mail Order:  Local  Ordering Provider:  Dr. Elena  [facility] Call Back Number:  [phone]  Additional Information:  TEO

## 2020-12-22 RX ORDER — OXYCODONE AND ACETAMINOPHEN 10; 325 MG/1; MG/1
1 TABLET ORAL EVERY 8 HOURS PRN
Qty: 90 TABLET | Refills: 0 | OUTPATIENT
Start: 2020-12-22

## 2021-03-31 ENCOUNTER — OFFICE VISIT (OUTPATIENT)
Dept: FAMILY MEDICINE | Facility: CLINIC | Age: 26
End: 2021-03-31
Payer: MEDICARE

## 2021-03-31 VITALS
HEART RATE: 117 BPM | OXYGEN SATURATION: 97 % | BODY MASS INDEX: 19.99 KG/M2 | WEIGHT: 120 LBS | HEIGHT: 65 IN | SYSTOLIC BLOOD PRESSURE: 116 MMHG | RESPIRATION RATE: 17 BRPM | TEMPERATURE: 97 F | DIASTOLIC BLOOD PRESSURE: 75 MMHG

## 2021-03-31 DIAGNOSIS — G89.29 CHRONIC MIDLINE LOW BACK PAIN WITHOUT SCIATICA: ICD-10-CM

## 2021-03-31 DIAGNOSIS — Z11.4 ENCOUNTER FOR SCREENING FOR HUMAN IMMUNODEFICIENCY VIRUS (HIV): ICD-10-CM

## 2021-03-31 DIAGNOSIS — M54.50 CHRONIC MIDLINE LOW BACK PAIN WITHOUT SCIATICA: ICD-10-CM

## 2021-03-31 DIAGNOSIS — G35 MULTIPLE SCLEROSIS: Primary | ICD-10-CM

## 2021-03-31 DIAGNOSIS — Z13.220 ENCOUNTER FOR LIPID SCREENING FOR CARDIOVASCULAR DISEASE: ICD-10-CM

## 2021-03-31 DIAGNOSIS — S99.829A TORN TOENAIL: ICD-10-CM

## 2021-03-31 DIAGNOSIS — N31.9 NEUROGENIC BLADDER: ICD-10-CM

## 2021-03-31 DIAGNOSIS — Z13.6 ENCOUNTER FOR LIPID SCREENING FOR CARDIOVASCULAR DISEASE: ICD-10-CM

## 2021-03-31 PROCEDURE — 99214 OFFICE O/P EST MOD 30 MIN: CPT | Mod: S$GLB,,, | Performed by: FAMILY MEDICINE

## 2021-03-31 PROCEDURE — 99214 PR OFFICE/OUTPT VISIT, EST, LEVL IV, 30-39 MIN: ICD-10-PCS | Mod: S$GLB,,, | Performed by: FAMILY MEDICINE

## 2021-03-31 RX ORDER — OXYCODONE AND ACETAMINOPHEN 10; 325 MG/1; MG/1
1 TABLET ORAL EVERY 8 HOURS PRN
Qty: 90 TABLET | Refills: 0 | Status: SHIPPED | OUTPATIENT
Start: 2021-03-31 | End: 2021-04-28 | Stop reason: SDUPTHER

## 2021-04-11 PROCEDURE — G0179 MD RECERTIFICATION HHA PT: HCPCS | Mod: ,,, | Performed by: FAMILY MEDICINE

## 2021-04-11 PROCEDURE — G0179 PR HOME HEALTH MD RECERTIFICATION: ICD-10-PCS | Mod: ,,, | Performed by: FAMILY MEDICINE

## 2021-04-14 ENCOUNTER — APPOINTMENT (OUTPATIENT)
Dept: LAB | Facility: HOSPITAL | Age: 26
End: 2021-04-14
Attending: FAMILY MEDICINE
Payer: MEDICARE

## 2021-04-14 ENCOUNTER — OFFICE VISIT (OUTPATIENT)
Dept: PODIATRY | Facility: CLINIC | Age: 26
End: 2021-04-14
Payer: MEDICARE

## 2021-04-14 ENCOUNTER — LAB VISIT (OUTPATIENT)
Dept: FAMILY MEDICINE | Facility: CLINIC | Age: 26
End: 2021-04-14
Payer: MEDICARE

## 2021-04-14 VITALS
OXYGEN SATURATION: 95 % | RESPIRATION RATE: 15 BRPM | HEART RATE: 58 BPM | WEIGHT: 120 LBS | BODY MASS INDEX: 19.99 KG/M2 | HEIGHT: 65 IN

## 2021-04-14 DIAGNOSIS — Z13.6 ENCOUNTER FOR LIPID SCREENING FOR CARDIOVASCULAR DISEASE: ICD-10-CM

## 2021-04-14 DIAGNOSIS — Z11.4 ENCOUNTER FOR SCREENING FOR HUMAN IMMUNODEFICIENCY VIRUS (HIV): ICD-10-CM

## 2021-04-14 DIAGNOSIS — S99.829A TORN TOENAIL: ICD-10-CM

## 2021-04-14 DIAGNOSIS — Z13.220 ENCOUNTER FOR LIPID SCREENING FOR CARDIOVASCULAR DISEASE: ICD-10-CM

## 2021-04-14 LAB
CHOLEST SERPL-MCNC: 230 MG/DL (ref 120–199)
CHOLEST/HDLC SERPL: 2.9 {RATIO} (ref 2–5)
HDLC SERPL-MCNC: 79 MG/DL (ref 40–75)
HDLC SERPL: 34.3 % (ref 20–50)
LDLC SERPL CALC-MCNC: 133.8 MG/DL (ref 63–159)
NONHDLC SERPL-MCNC: 151 MG/DL
TRIGL SERPL-MCNC: 86 MG/DL (ref 30–150)

## 2021-04-14 PROCEDURE — 99202 PR OFFICE/OUTPT VISIT, NEW, LEVL II, 15-29 MIN: ICD-10-PCS | Mod: S$PBB,,, | Performed by: PODIATRIST

## 2021-04-14 PROCEDURE — 99202 OFFICE O/P NEW SF 15 MIN: CPT | Mod: S$PBB,,, | Performed by: PODIATRIST

## 2021-04-14 PROCEDURE — 99999 PR PBB SHADOW E&M-EST. PATIENT-LVL III: CPT | Mod: PBBFAC,,, | Performed by: PODIATRIST

## 2021-04-14 PROCEDURE — 99999 PR PBB SHADOW E&M-EST. PATIENT-LVL III: ICD-10-PCS | Mod: PBBFAC,,, | Performed by: PODIATRIST

## 2021-04-14 PROCEDURE — 86703 HIV-1/HIV-2 1 RESULT ANTBDY: CPT | Performed by: FAMILY MEDICINE

## 2021-04-14 PROCEDURE — 80061 LIPID PANEL: CPT | Performed by: FAMILY MEDICINE

## 2021-04-14 PROCEDURE — 99213 OFFICE O/P EST LOW 20 MIN: CPT | Mod: PBBFAC,PN | Performed by: PODIATRIST

## 2021-04-15 LAB — HIV 1+2 AB+HIV1 P24 AG SERPL QL IA: NEGATIVE

## 2021-05-18 ENCOUNTER — PATIENT MESSAGE (OUTPATIENT)
Dept: ADMINISTRATIVE | Facility: HOSPITAL | Age: 26
End: 2021-05-18

## 2021-05-20 ENCOUNTER — EXTERNAL HOME HEALTH (OUTPATIENT)
Dept: HOME HEALTH SERVICES | Facility: HOSPITAL | Age: 26
End: 2021-05-20
Payer: MEDICARE

## 2021-05-28 DIAGNOSIS — M54.50 CHRONIC MIDLINE LOW BACK PAIN WITHOUT SCIATICA: ICD-10-CM

## 2021-05-28 DIAGNOSIS — G89.29 CHRONIC MIDLINE LOW BACK PAIN WITHOUT SCIATICA: ICD-10-CM

## 2021-05-28 DIAGNOSIS — G35 MULTIPLE SCLEROSIS: ICD-10-CM

## 2021-05-31 ENCOUNTER — DOCUMENT SCAN (OUTPATIENT)
Dept: HOME HEALTH SERVICES | Facility: HOSPITAL | Age: 26
End: 2021-05-31
Payer: MEDICARE

## 2021-05-31 RX ORDER — OXYCODONE AND ACETAMINOPHEN 10; 325 MG/1; MG/1
1 TABLET ORAL EVERY 8 HOURS PRN
Qty: 90 TABLET | Refills: 0 | Status: SHIPPED | OUTPATIENT
Start: 2021-05-31 | End: 2021-07-06

## 2021-06-09 ENCOUNTER — EXTERNAL HOME HEALTH (OUTPATIENT)
Dept: HOME HEALTH SERVICES | Facility: HOSPITAL | Age: 26
End: 2021-06-09
Payer: MEDICARE

## 2021-06-10 PROCEDURE — G0179 MD RECERTIFICATION HHA PT: HCPCS | Mod: ,,, | Performed by: FAMILY MEDICINE

## 2021-06-10 PROCEDURE — G0179 PR HOME HEALTH MD RECERTIFICATION: ICD-10-PCS | Mod: ,,, | Performed by: FAMILY MEDICINE

## 2021-06-11 ENCOUNTER — OFFICE VISIT (OUTPATIENT)
Dept: FAMILY MEDICINE | Facility: CLINIC | Age: 26
End: 2021-06-11
Payer: MEDICARE

## 2021-06-11 DIAGNOSIS — G35 MULTIPLE SCLEROSIS: Primary | ICD-10-CM

## 2021-06-11 DIAGNOSIS — M54.50 CHRONIC MIDLINE LOW BACK PAIN WITHOUT SCIATICA: ICD-10-CM

## 2021-06-11 DIAGNOSIS — G89.29 CHRONIC MIDLINE LOW BACK PAIN WITHOUT SCIATICA: ICD-10-CM

## 2021-06-11 PROCEDURE — 99214 PR OFFICE/OUTPT VISIT, EST, LEVL IV, 30-39 MIN: ICD-10-PCS | Mod: 95,,, | Performed by: FAMILY MEDICINE

## 2021-06-11 PROCEDURE — 99214 OFFICE O/P EST MOD 30 MIN: CPT | Mod: 95,,, | Performed by: FAMILY MEDICINE

## 2021-06-14 ENCOUNTER — DOCUMENT SCAN (OUTPATIENT)
Dept: HOME HEALTH SERVICES | Facility: HOSPITAL | Age: 26
End: 2021-06-14
Payer: MEDICARE

## 2021-07-02 ENCOUNTER — NURSE TRIAGE (OUTPATIENT)
Dept: ADMINISTRATIVE | Facility: CLINIC | Age: 26
End: 2021-07-02

## 2021-07-09 PROCEDURE — G0180 MD CERTIFICATION HHA PATIENT: HCPCS | Mod: ,,, | Performed by: FAMILY MEDICINE

## 2021-07-09 PROCEDURE — G0180 PR HOME HEALTH MD CERTIFICATION: ICD-10-PCS | Mod: ,,, | Performed by: FAMILY MEDICINE

## 2021-07-17 ENCOUNTER — DOCUMENT SCAN (OUTPATIENT)
Dept: HOME HEALTH SERVICES | Facility: HOSPITAL | Age: 26
End: 2021-07-17
Payer: MEDICARE

## 2021-07-22 ENCOUNTER — EXTERNAL HOME HEALTH (OUTPATIENT)
Dept: HOME HEALTH SERVICES | Facility: HOSPITAL | Age: 26
End: 2021-07-22
Payer: MEDICARE

## 2021-08-17 ENCOUNTER — TELEPHONE (OUTPATIENT)
Dept: FAMILY MEDICINE | Facility: CLINIC | Age: 26
End: 2021-08-17

## 2021-08-19 ENCOUNTER — TELEPHONE (OUTPATIENT)
Dept: FAMILY MEDICINE | Facility: CLINIC | Age: 26
End: 2021-08-19